# Patient Record
Sex: MALE | Race: WHITE | Employment: STUDENT | URBAN - METROPOLITAN AREA
[De-identification: names, ages, dates, MRNs, and addresses within clinical notes are randomized per-mention and may not be internally consistent; named-entity substitution may affect disease eponyms.]

---
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
## 2023-04-24 ENCOUNTER — TELEPHONE (OUTPATIENT)
Dept: FAMILY MEDICINE CLINIC | Age: 23
End: 2023-04-24

## 2023-04-24 NOTE — TELEPHONE ENCOUNTER
----- Message from [facility] [place] sent at 4/24/2023  1:09 PM EDT -----  Subject: Appointment Request    Reason for Call: New Patient/New to Provider Appointment needed: Routine   Physical Exam    QUESTIONS    Reason for appointment request? Requested Provider unavailable - Quincy Gathers     Additional Information for Provider? Pt is trying to get established with   a PCP, he is changing from his pediatrician. Pt is hoping to get   established with provider Alexandra Carbone however he is needing an   appointment before he starts grad school May 15th so he is open to seeing   another provider to be able to get in sooner. If there is anything you can   do for him he would greatly appreciate it.  You can reach out to him at   [phone].  ---------------------------------------------------------------------------  --------------  [id number] [facility]  [phone]; OK to leave message on voicemail  ---------------------------------------------------------------------------  --------------  SCRIPT ANSWERS  COVID Screen: Martha Jackson